# Patient Record
Sex: FEMALE | Race: WHITE | Employment: FULL TIME | URBAN - METROPOLITAN AREA
[De-identification: names, ages, dates, MRNs, and addresses within clinical notes are randomized per-mention and may not be internally consistent; named-entity substitution may affect disease eponyms.]

---

## 2018-09-28 PROCEDURE — 36415 COLL VENOUS BLD VENIPUNCTURE: CPT | Performed by: OBSTETRICS & GYNECOLOGY

## 2018-09-28 PROCEDURE — 87389 HIV-1 AG W/HIV-1&-2 AB AG IA: CPT | Performed by: OBSTETRICS & GYNECOLOGY

## 2018-09-28 PROCEDURE — 86780 TREPONEMA PALLIDUM: CPT | Performed by: OBSTETRICS & GYNECOLOGY

## 2018-09-28 PROCEDURE — 86803 HEPATITIS C AB TEST: CPT | Performed by: OBSTETRICS & GYNECOLOGY

## 2019-04-18 PROCEDURE — 86480 TB TEST CELL IMMUN MEASURE: CPT | Performed by: FAMILY MEDICINE

## 2019-04-18 PROCEDURE — 36415 COLL VENOUS BLD VENIPUNCTURE: CPT | Performed by: FAMILY MEDICINE

## 2021-09-16 PROBLEM — S83.207A TEAR OF MENISCUS OF LEFT KNEE, UNSPECIFIED MENISCUS, UNSPECIFIED TEAR TYPE, UNSPECIFIED WHETHER OLD OR CURRENT TEAR: Status: ACTIVE | Noted: 2021-09-16

## 2021-10-18 PROBLEM — M22.2X2 PATELLOFEMORAL PAIN SYNDROME OF LEFT KNEE: Status: ACTIVE | Noted: 2021-10-18

## 2025-07-05 ENCOUNTER — HOSPITAL ENCOUNTER (EMERGENCY)
Facility: HOSPITAL | Age: 45
Discharge: HOME | End: 2025-07-05
Attending: EMERGENCY MEDICINE
Payer: COMMERCIAL

## 2025-07-05 VITALS
RESPIRATION RATE: 16 BRPM | WEIGHT: 148 LBS | DIASTOLIC BLOOD PRESSURE: 72 MMHG | HEART RATE: 70 BPM | BODY MASS INDEX: 23 KG/M2 | TEMPERATURE: 97 F | SYSTOLIC BLOOD PRESSURE: 114 MMHG | OXYGEN SATURATION: 97 %

## 2025-07-05 DIAGNOSIS — G43.109 OCULAR MIGRAINE: Primary | ICD-10-CM

## 2025-07-05 PROCEDURE — 99283 EMERGENCY DEPT VISIT LOW MDM: CPT

## 2025-07-05 NOTE — DISCHARGE INSTRUCTIONS
Ocular Migraine Discharge    What Happened Today    You experienced an episode of visual disturbance called an \"ocular migraine\" or \"migraine with aura.\" This is a type of migraine where changes in vision--such as seeing flashing lights, zigzag lines, or temporary blind spots--occur, usually lasting 5 to 60 minutes, and often followed by a headache. Your symptoms resolved completely, and your examination in the emergency department was normal.    What to Expect    - Ocular migraines are common and usually not dangerous when they occur in isolation and resolve completely.    - Most people recover fully without any long-term effects.    - It is not necessary to have a brain scan (like a CT or MRI) unless there are unusual features, such as sudden onset, persistent symptoms, weakness, trouble speaking, fever, or other concerning signs.    How to Care for Yourself    - Rest: If you feel tired or have a mild headache, rest in a quiet, dark room.    - Hydration: Drink plenty of water.    - Pain Relief: If you develop a headache, over-the-counter medications like acetaminophen (Tylenol) or a nonsteroidal anti-inflammatory drug (NSAID) such as ibuprofen may help, unless you have been told not to use these medicines.  - Avoid Triggers: Try to identify and avoid things that may trigger migraines, such as skipping meals, dehydration, lack of sleep, stress, or certain foods.    - Lifestyle: Maintain regular sleep, meals, and exercise. Manage stress with relaxation techniques or mindfulness practices.[3]    When to Seek Medical Attention    Return to the emergency department or contact your healthcare provider if you experience any of the following:    - Sudden, severe headache (\"worst headache of your life\")    - Persistent visual changes or loss of vision that does not go away    - Weakness, numbness, trouble speaking, or confusion    - Fever, neck stiffness, or rash    - Headache that is different from your usual migraines or  does not improve with usual treatment    Prevention and Follow-Up    - Keep a diary of any future episodes, noting what you were doing, what you ate, and how you felt before the symptoms started. This can help identify triggers and guide future care    - If you have frequent migraines or if they interfere with your daily life, discuss preventive treatment options with your primary care provider or a neurologist.    - Avoid using pain medications more than 10-15 days per month, as overuse can lead to more frequent headaches.    Medications to Avoid    - Opioid pain medications (such as codeine or hydrocodone) are not recommended for migraine treatment due to risk of side effects and dependence.    Summary    Ocular migraines are usually benign, especially when isolated and transient. Most people do not need further testing or treatment unless symptoms change or new warning signs develop. Focus on healthy habits, avoid known triggers, and seek care if you develop any concerning symptoms.    If you have any questions or concerns, contact your healthcare provider.

## 2025-07-05 NOTE — ED INITIAL ASSESSMENT (HPI)
Pt to ED from home c/o temporary vision loss to R eye. Pt was seen at  and directed to ER. Pt also c/o minor headache. Pt states that 6yrs ago she had an ocular migraine, with similar symptoms. Pt states that vision has returned to normal.

## 2025-07-06 NOTE — ED PROVIDER NOTES
Patient Seen in: [facility] Emergency Department       The following individual(s) verbally consented to be recorded using ambient AI listening technology and understand that they can each withdraw their consent to this listening technology at any point by asking the clinician to turn off or pause the recording:    Patient name: Varsha Claros  Additional names:        History  Chief Complaint   Patient presents with    Vision Problem     Stated Complaint: flashing light in R eye at 0930 this morining, then resolved now with headache    Subjective:   HPI     Romi Claros is a 45 year old female who presents with vision problems in the right eye.    She experienced vision problems in her right eye that began at 9:30 AM today, shortly after waking up. The visual disturbance resembled 'the way that a flashing light would look with your eyes closed,' but with her eye open. It appeared as a dark cloud in the lower part of her vision, which improved when she looked up or at well-lit areas. The episode resolved within five to six minutes.  There is no curtain that came over her vision and the visual defect was not dense.  Patient had no other ocular or neurologic symptoms.  She has complete resolution at this time and has no acute symptoms.  She has no foreign body sensation in her eye.  There is no history of trauma to the eye.  The patient has no pain in her eyes.    She has a history of an ocular migraine six years ago, which involved red and blue flashing lights and lasted about twenty minutes. Since then, she has not experienced similar episodes until today. She does not typically suffer from migraines or headaches, although she has TMJ, which occasionally causes jaw pain that radiates to her head.    No numbness, weakness, or eye pain. No recent caffeine withdrawal.         Objective:     Past Medical History:    CHICKEN POX    as a child    DVT    left leg    Encounter for insertion of Mirena IUD    SEASONAL  ALLERGIES    Varicose veins of legs    left leg              Past Surgical History:   Procedure Laterality Date      2004    Breech    Knee surgery      left     Mirena, iud  17    [place] teeth removed                  Social History     Socioeconomic History    Marital status:    Tobacco Use    Smoking status: Never    Smokeless tobacco: Never   Vaping Use    Vaping status: Never Used   Substance and Sexual Activity    Alcohol use: Yes     Alcohol/week: 0.0 standard drinks of alcohol     Comment: Soc    Drug use: Yes     Frequency: 5.0 times per week     Types: Cannabis    Sexual activity: Yes     Partners: Male     Birth control/protection: Condom, I.U.D.                                Physical Exam    ED Triage Vitals [25 1557]   /72   Pulse 70   Resp 16   Temp 97.2 °F (36.2 °C)   Temp src Temporal   SpO2 97 %   O2 Device None (Room air)       Current Vitals:   Vital Signs  BP: 114/72  Pulse: 70  Resp: 16  Temp: 97.2 °F (36.2 °C)  Temp src: Temporal    Oxygen Therapy  SpO2: 97 %  O2 Device: None (Room air)      Right Eye Chart Acuity: 20/20, Uncorrected  Left Eye Chart Acuity: 20/25, Uncorrected      Physical Exam  Vitals and nursing note reviewed.   Constitutional:       General: She is not in acute distress.     Appearance: Normal appearance. She is well-developed.   Eyes:      General:         Right eye: No discharge.         Left eye: No discharge.      Extraocular Movements: Extraocular movements intact.      Pupils: Pupils are equal, round, and reactive to light.      Comments: Funduscopic exam revealed normal vessels and retina bilaterally.  Additionally, ultrasound of the right eye was performed to evaluate for possible retinal detachment.  There was no evidence of detachment and negative washing machine sign.   Neurological:      Mental Status: She is alert and oriented to person, place, and time.      Comments: No focal deficits.  Normal speech and cognition.  No  focal motor or sensory deficits.  Gait is normal.  No lateralizing numbness or weakness.                ED Course  Labs Reviewed - No data to display  After exam and discussing with patient, with shared decision making, patient opted not to have MRI performed.  Patient's presentation is certainly most consistent with an ocular migraine and given her young age, normal exam and lack of comorbidities and risk factors, I feel that more significant pathology is unlikely.                       MDM     Medical Decision Making  A patient presented to the Emergency Department with an acute visual disturbance in the right eye, described as flashing lights and a dark cloud in the lower visual field, which resolved spontaneously within 5-6 minutes. The patient has a history of a similar episode six years ago, diagnosed as an ocular migraine. There was no associated pain, numbness, or weakness. Examination and ultrasound ruled out retinal detachment, and the clinical presentation was not consistent with a stroke.     Differential diagnosis includes, but is not limited to:  - Ocular Migraine: The patient's symptoms and history are consistent with an ocular migraine, characterized by transient visual disturbances without associated neurological deficits.  - Retinal Detachment: Retinal detachment was considered due to the visual disturbance but was ruled out by ultrasound findings showing no detachment.  - Stroke: A stroke was considered but deemed unlikely due to the transient nature of the symptoms, lack of neurological deficits, and the patient's low risk profile.    Patient's presentation is consistent with ocular migraine  Acute visual disturbance in the right eye consistent with ocular migraine, with retinal detachment and stroke ruled out.  - Monitor for recurrence of vision problems or development of eye pain.  Follow-up with primary care physician and ophthalmology.      Disposition: Home            Medical Decision  Making      Disposition and Plan     Clinical Impression:  1. Ocular migraine         Disposition:  Discharge  7/5/2025  5:20 pm    Follow-up:  Justin Broussard MD  [address]  [facility] [id number] [phone]    Follow up      Alexandro Valerio MD  [address]  [facility] [id number] [phone]    Follow up            Medications Prescribed:  Discharge Medication List as of 7/5/2025  5:26 PM                Supplementary Documentation: